# Patient Record
Sex: FEMALE | Race: WHITE | NOT HISPANIC OR LATINO | URBAN - NONMETROPOLITAN AREA
[De-identification: names, ages, dates, MRNs, and addresses within clinical notes are randomized per-mention and may not be internally consistent; named-entity substitution may affect disease eponyms.]

---

## 2024-08-15 ENCOUNTER — OFFICE VISIT (OUTPATIENT)
Dept: FAMILY MEDICINE CLINIC | Facility: CLINIC | Age: 24
End: 2024-08-15
Payer: COMMERCIAL

## 2024-08-15 ENCOUNTER — PATIENT ROUNDING (BHMG ONLY) (OUTPATIENT)
Dept: FAMILY MEDICINE CLINIC | Facility: CLINIC | Age: 24
End: 2024-08-15

## 2024-08-15 VITALS
TEMPERATURE: 98 F | BODY MASS INDEX: 48.48 KG/M2 | HEIGHT: 65 IN | OXYGEN SATURATION: 100 % | SYSTOLIC BLOOD PRESSURE: 136 MMHG | HEART RATE: 99 BPM | WEIGHT: 291 LBS | DIASTOLIC BLOOD PRESSURE: 94 MMHG | RESPIRATION RATE: 16 BRPM

## 2024-08-15 DIAGNOSIS — Z76.89 ENCOUNTER TO ESTABLISH CARE: ICD-10-CM

## 2024-08-15 DIAGNOSIS — F41.0 PANIC ATTACKS: ICD-10-CM

## 2024-08-15 DIAGNOSIS — F41.9 ANXIETY: ICD-10-CM

## 2024-08-15 DIAGNOSIS — Z00.00 ANNUAL PHYSICAL EXAM: Primary | ICD-10-CM

## 2024-08-15 RX ORDER — FLUOXETINE 10 MG/1
10 CAPSULE ORAL DAILY
Qty: 30 CAPSULE | Refills: 1 | Status: SHIPPED | OUTPATIENT
Start: 2024-08-15

## 2024-08-15 RX ORDER — BUSPIRONE HYDROCHLORIDE 5 MG/1
5 TABLET ORAL 2 TIMES DAILY
Qty: 60 TABLET | Refills: 1 | Status: SHIPPED | OUTPATIENT
Start: 2024-08-15

## 2024-08-16 LAB
25(OH)D3+25(OH)D2 SERPL-MCNC: 29.7 NG/ML (ref 30–100)
ALBUMIN SERPL-MCNC: 4.5 G/DL (ref 3.5–5.2)
ALBUMIN/GLOB SERPL: 1.9 G/DL
ALP SERPL-CCNC: 65 U/L (ref 39–117)
ALT SERPL-CCNC: 23 U/L (ref 1–33)
AST SERPL-CCNC: 18 U/L (ref 1–32)
BASOPHILS # BLD AUTO: 0.03 10*3/MM3 (ref 0–0.2)
BASOPHILS NFR BLD AUTO: 0.4 % (ref 0–1.5)
BILIRUB SERPL-MCNC: 0.3 MG/DL (ref 0–1.2)
BUN SERPL-MCNC: 11 MG/DL (ref 6–20)
BUN/CREAT SERPL: 13.9 (ref 7–25)
CALCIUM SERPL-MCNC: 10 MG/DL (ref 8.6–10.5)
CHLORIDE SERPL-SCNC: 106 MMOL/L (ref 98–107)
CHOLEST SERPL-MCNC: 136 MG/DL (ref 0–200)
CO2 SERPL-SCNC: 24.9 MMOL/L (ref 22–29)
CREAT SERPL-MCNC: 0.79 MG/DL (ref 0.57–1)
EGFRCR SERPLBLD CKD-EPI 2021: 107.3 ML/MIN/1.73
EOSINOPHIL # BLD AUTO: 0.03 10*3/MM3 (ref 0–0.4)
EOSINOPHIL NFR BLD AUTO: 0.4 % (ref 0.3–6.2)
ERYTHROCYTE [DISTWIDTH] IN BLOOD BY AUTOMATED COUNT: 11.9 % (ref 12.3–15.4)
GLOBULIN SER CALC-MCNC: 2.4 GM/DL
GLUCOSE SERPL-MCNC: 80 MG/DL (ref 65–99)
HCT VFR BLD AUTO: 42.5 % (ref 34–46.6)
HDLC SERPL-MCNC: 43 MG/DL (ref 40–60)
HGB BLD-MCNC: 13.8 G/DL (ref 12–15.9)
IMM GRANULOCYTES # BLD AUTO: 0.01 10*3/MM3 (ref 0–0.05)
IMM GRANULOCYTES NFR BLD AUTO: 0.1 % (ref 0–0.5)
LDLC SERPL CALC-MCNC: 78 MG/DL (ref 0–100)
LYMPHOCYTES # BLD AUTO: 1.42 10*3/MM3 (ref 0.7–3.1)
LYMPHOCYTES NFR BLD AUTO: 18.1 % (ref 19.6–45.3)
MCH RBC QN AUTO: 29 PG (ref 26.6–33)
MCHC RBC AUTO-ENTMCNC: 32.5 G/DL (ref 31.5–35.7)
MCV RBC AUTO: 89.3 FL (ref 79–97)
MONOCYTES # BLD AUTO: 0.63 10*3/MM3 (ref 0.1–0.9)
MONOCYTES NFR BLD AUTO: 8 % (ref 5–12)
NEUTROPHILS # BLD AUTO: 5.74 10*3/MM3 (ref 1.7–7)
NEUTROPHILS NFR BLD AUTO: 73 % (ref 42.7–76)
NRBC BLD AUTO-RTO: 0 /100 WBC (ref 0–0.2)
PLATELET # BLD AUTO: 323 10*3/MM3 (ref 140–450)
POTASSIUM SERPL-SCNC: 4 MMOL/L (ref 3.5–5.2)
PROT SERPL-MCNC: 6.9 G/DL (ref 6–8.5)
RBC # BLD AUTO: 4.76 10*6/MM3 (ref 3.77–5.28)
SODIUM SERPL-SCNC: 139 MMOL/L (ref 136–145)
TRIGL SERPL-MCNC: 73 MG/DL (ref 0–150)
TSH SERPL DL<=0.005 MIU/L-ACNC: 1.19 UIU/ML (ref 0.27–4.2)
VIT B12 SERPL-MCNC: 355 PG/ML (ref 211–946)
VLDLC SERPL CALC-MCNC: 15 MG/DL (ref 5–40)
WBC # BLD AUTO: 7.86 10*3/MM3 (ref 3.4–10.8)

## 2025-03-26 ENCOUNTER — TELEPHONE (OUTPATIENT)
Dept: FAMILY MEDICINE CLINIC | Facility: CLINIC | Age: 25
End: 2025-03-26

## 2025-03-26 NOTE — TELEPHONE ENCOUNTER
Caller: Julia Auguste    Relationship to patient: Self    Best call back number:     [phone] (Home), PLEASE LEAVE VOICEMAIL       Chief complaint: HEADACHE, DIZZINESS AND RIGHT EAR PAIN    Type of visit: OFFICE VISIT    Requested date: 3/26/25 ANY TIME

## 2025-04-08 ENCOUNTER — OFFICE VISIT (OUTPATIENT)
Dept: FAMILY MEDICINE CLINIC | Facility: CLINIC | Age: 25
End: 2025-04-08
Payer: COMMERCIAL

## 2025-04-08 VITALS
WEIGHT: 293 LBS | SYSTOLIC BLOOD PRESSURE: 141 MMHG | HEIGHT: 65 IN | OXYGEN SATURATION: 98 % | TEMPERATURE: 98.6 F | RESPIRATION RATE: 18 BRPM | HEART RATE: 90 BPM | DIASTOLIC BLOOD PRESSURE: 87 MMHG | BODY MASS INDEX: 48.82 KG/M2

## 2025-04-08 DIAGNOSIS — H61.23 IMPACTED CERUMEN OF BOTH EARS: ICD-10-CM

## 2025-04-08 DIAGNOSIS — H91.91 DECREASED HEARING OF RIGHT EAR: Primary | ICD-10-CM

## 2025-04-08 DIAGNOSIS — H92.01: ICD-10-CM

## 2025-04-08 NOTE — PROGRESS NOTES
"Chief Complaint  Earache    Subjective        Julia Auguste presents to [facility]  Earache   There is pain in the right ear. This is a new problem. The current episode started 1 to 4 weeks ago. The problem has been worse.     History of Present Illness  The patient is a 25-year-old female who presents for evaluation of an earache.    She reports experiencing a dull, stuffy sensation in her right ear for the past 2 weeks. The pain, which she rates as a 3 on a scale of 1 to 10, intensifies during the night. She has been managing the discomfort with over-the-counter Tylenol. She does not have any associated fever or chills. She also reports episodes of dizziness and tinnitus but does not have any cough. She has no history of chronic ear infections.    MEDICATIONS  Discontinued: Prozac, Buspar  pt says she does not take them any more.        The following portions of the patient's history were reviewed and updated as appropriate: allergies, current medications, past family history, past medical history, past social history, past surgical history and problem list.    Objective   Vital Signs:  /87 (BP Location: Right arm, Patient Position: Sitting, Cuff Size: Large Adult)   Pulse 90   Temp 98.6 °F (37 °C) (Infrared)   Resp 18   Ht 165.1 cm (65\") Comment: per patient  Wt (!) 137 kg (302 lb)   SpO2 98%   BMI 50.26 kg/m²   Estimated body mass index is 50.26 kg/m² as calculated from the following:    Height as of this encounter: 165.1 cm (65\").    Weight as of this encounter: 137 kg (302 lb).       Class 3 Severe Obesity (BMI >=40). Obesity-related health conditions include the following: none. Obesity is worsening. BMI is is above average; BMI management plan is completed. We discussed portion control and increasing exercise.    Physical Exam  Vitals and nursing note reviewed.   Constitutional:       General: She is awake.      Appearance: Normal appearance. She is " well-developed and well-groomed.   HENT:      Head: Normocephalic and atraumatic.      Right Ear: External ear normal. Decreased hearing noted. There is impacted cerumen.      Left Ear: Hearing, tympanic membrane, ear canal and external ear normal. There is impacted cerumen.      Ears:      Comments: Right and left ear is impacted with cerumen that is so hard, can not move it with curette, pt needs to go home and use mineral oil or debrox to work on softening wax.  She may return to have irrigation      Nose: Nose normal.      Mouth/Throat:      Lips: Pink.      Pharynx: Oropharynx is clear.   Eyes:      General: Lids are normal.      Conjunctiva/sclera: Conjunctivae normal.   Cardiovascular:      Rate and Rhythm: Normal rate and regular rhythm.      Heart sounds: Normal heart sounds.   Pulmonary:      Effort: Pulmonary effort is normal.      Breath sounds: Normal breath sounds and air entry.   Musculoskeletal:      Cervical back: Full passive range of motion without pain.      Right lower leg: No edema.      Left lower leg: No edema.   Lymphadenopathy:      Head:      Right side of head: No submental, submandibular or tonsillar adenopathy.      Left side of head: No submental, submandibular or tonsillar adenopathy.   Skin:     General: Skin is warm and dry.   Neurological:      Mental Status: She is alert.      Sensory: Sensation is intact.      Motor: Motor function is intact.      Coordination: Coordination is intact.      Gait: Gait is intact.   Psychiatric:         Attention and Perception: Attention and perception normal.         Mood and Affect: Mood and affect normal.         Speech: Speech normal.         Behavior: Behavior normal. Behavior is cooperative.         Thought Content: Thought content normal.         Cognition and Memory: Cognition and memory normal.         Judgment: Judgment normal.        Physical Exam      Result Review :          Results             Assessment and Plan     Diagnoses and all  orders for this visit:    1. Decreased hearing of right ear (Primary)  2. Earache symptoms, right  3. Impacted cerumen of both ears       Pt was instructed to get some mineral oil and put a couple drops in ear nightly and then she can return to have ears irrigated        May continue to take ibuprofen or tylenol as directed as needed for pain and fever.     Assessment & Plan        ICD-10-CM ICD-9-CM   1. Decreased hearing of right ear  H91.91 389.9   2. Earache symptoms, right  H92.01 388.70   3. Impacted cerumen of both ears  H61.23 380.4              pt needs to go home and use mineral oil or debrox to work on softening wax.  She may return to have irrigation   Follow Up     Return in about 1 week (around 4/15/2025), or follow next tuesday, for Recheck.  Patient was given instructions and counseling regarding her condition or for health maintenance advice. Please see specific information pulled into the AVS if appropriate.       Patient or patient representative verbalized consent for the use of Ambient Listening during the visit with  Nahed Lassiter DNP, RODRIGUEZ for chart documentation. 4/8/2025  08:34 CDT    Electronically signed by Nahed Lassiter DNP, RODRIGUEZ, 04/08/25, 8:34 AM CDT.

## 2025-04-15 ENCOUNTER — OFFICE VISIT (OUTPATIENT)
Dept: FAMILY MEDICINE CLINIC | Facility: CLINIC | Age: 25
End: 2025-04-15
Payer: COMMERCIAL

## 2025-04-15 VITALS
TEMPERATURE: 97.1 F | OXYGEN SATURATION: 96 % | HEIGHT: 65 IN | WEIGHT: 293 LBS | SYSTOLIC BLOOD PRESSURE: 130 MMHG | BODY MASS INDEX: 48.82 KG/M2 | HEART RATE: 91 BPM | DIASTOLIC BLOOD PRESSURE: 85 MMHG

## 2025-04-15 DIAGNOSIS — H91.93 DECREASED HEARING OF BOTH EARS: Primary | ICD-10-CM

## 2025-04-15 DIAGNOSIS — H61.23 BILATERAL IMPACTED CERUMEN: ICD-10-CM

## 2025-04-15 NOTE — PROGRESS NOTES
"Chief Complaint  Follow-up (Pt here for f/u on decreased hearing in right ear. Pt reports hearing has improved since last visit. )    Subjective        Julia Auguste presents to [facility]  History of Present Illness  History of Present Illness  The patient is a 25-year-old female who presents for ear flushing. She was previously seen on 04/08/2025 for decreased hearing and impacted cerumen. She was advised to manage the condition and return for a follow-up.    She reports an improvement in her auditory function, attributing it to her efforts in managing the impacted cerumen. She experiences mild hearing loss in her left ear, which she believes is due to her self-care interventions. She does not experience systemic symptoms such as fever, chills, nausea, vomiting, or diarrhea. However, she reports experiencing tinnitus and vertigo, predominantly during nighttime.    The following portions of the patient's history were reviewed and updated as appropriate: allergies, current medications, past family history, past medical history, past social history, past surgical history and problem list.    Objective   Vital Signs:  /85 (BP Location: Left arm, Patient Position: Sitting, Cuff Size: Large Adult)   Pulse 91   Temp 97.1 °F (36.2 °C) (Infrared)   Ht 165.1 cm (65\")   Wt (!) 139 kg (307 lb)   SpO2 96%   BMI 51.09 kg/m²   Estimated body mass index is 51.09 kg/m² as calculated from the following:    Height as of this encounter: 165.1 cm (65\").    Weight as of this encounter: 139 kg (307 lb).       Class 3 Severe Obesity (BMI >=40). Obesity-related health conditions include the following: none. Obesity is worsening. BMI is is above average; BMI management plan is completed. We discussed portion control and increasing exercise.      Physical Exam  Vitals and nursing note reviewed.   Constitutional:       General: She is awake.      Appearance: Normal appearance. She is " well-developed and well-groomed.   HENT:      Head: Normocephalic and atraumatic.      Right Ear: External ear normal. Decreased hearing noted. There is impacted cerumen.      Left Ear: External ear normal. Decreased hearing noted. There is impacted cerumen.      Ears:      Comments: Bilateral ear impaction       Nose: Nose normal.      Mouth/Throat:      Lips: Pink.      Pharynx: Oropharynx is clear.   Eyes:      General: Lids are normal.      Conjunctiva/sclera: Conjunctivae normal.   Cardiovascular:      Rate and Rhythm: Normal rate and regular rhythm.      Heart sounds: Normal heart sounds.   Pulmonary:      Effort: Pulmonary effort is normal.      Breath sounds: Normal breath sounds and air entry.   Musculoskeletal:      Cervical back: Full passive range of motion without pain.      Right lower leg: No edema.      Left lower leg: No edema.   Lymphadenopathy:      Head:      Right side of head: No submental, submandibular or tonsillar adenopathy.      Left side of head: No submental, submandibular or tonsillar adenopathy.   Skin:     General: Skin is warm and dry.   Neurological:      Mental Status: She is alert and oriented to person, place, and time.      Sensory: Sensation is intact.      Motor: Motor function is intact.      Coordination: Coordination is intact.      Gait: Gait is intact.   Psychiatric:         Attention and Perception: Attention and perception normal.         Mood and Affect: Mood and affect normal.         Speech: Speech normal.         Behavior: Behavior normal. Behavior is cooperative.         Thought Content: Thought content normal.         Cognition and Memory: Cognition and memory normal.         Judgment: Judgment normal.      Physical Exam  Vital Signs  Blood pressure is normal at 130/85. Heart rate is 91.    Result Review :          Results      Ear Cerumen Removal    Date/Time: 4/15/2025 8:10 AM    Performed by: Nahed Lassiter, RUBEN, APRN  Authorized by: Nahed Lassiter,  "RUBEN, APRN  Consent: Verbal consent obtained. Written consent obtained  Risks and benefits: risks, benefits and alternatives were discussed  Consent given by: patient  Patient understanding: patient states understanding of the procedure being performed  Patient consent: the patient's understanding of the procedure matches consent given  Procedure consent: procedure consent matches procedure scheduled  Relevant documents: relevant documents present and verified  Test results: test results not available  Site marked: the operative site was not marked  Imaging studies: imaging studies not available  Patient identity confirmed: verbally with patient  Time out: Immediately prior to procedure a \"time out\" was called to verify the correct patient, procedure, equipment, support staff and site/side marked as required.    Anesthesia:  Local Anesthetic: none  Location details: left ear and right ear  Patient tolerance: patient tolerated the procedure well with no immediate complications  Comments: Risk, benefits and alternative options for impacted cerumen discussed.  Pt wishes to proceed; verbal consent to irrigate ear obtained from the patient prior to beginning the procedure.   A curette was used in both ears unsuccessful.  mixture of 1 part luke warm water and 1 part hydrogen peroxide used to  flush/irrigated bilateral ear(s); bilateral ear(s) were irrigated with a 10 ml syringe and a 0.5 inch blunt  luer lock otic ; a moderate amount of cerumen removed from bilateral ear canal; post assessment revealed normal bilateral TM(s); negative for injections, perforations, erythema, retractions, scaring, or bulging; ; Well tolerated by patient.; no complains of pain during or post procedure, normal hearing post exam.   Procedure type: instrumentation, irrigation   Sedation:  Patient sedated: no            Assessment and Plan     Diagnoses and all orders for this visit:    1. Decreased hearing of both ears (Primary)    2. " Bilateral impacted cerumen  -     Ear Cerumen Removal      Assessment & Plan  1. Impacted cerumen.  She reports improvement in hearing but still experiences some hearing loss in the left ear, likely due to continued impacted cerumen. No fever, chills, nausea, vomiting, or diarrhea were reported. Mild tinnitus and dizziness are present, mainly at night.      ICD-10-CM ICD-9-CM   1. Decreased hearing of both ears  H91.93 389.9   2. Bilateral impacted cerumen  H61.23 380.4     Encouraged pt to use a couple drops of mineral oil in each ear weekly to prevent build up of cerumen.             Follow Up     No follow-ups on file.  Patient was given instructions and counseling regarding her condition or for health maintenance advice. Please see specific information pulled into the AVS if appropriate.       Patient or patient representative verbalized consent for the use of Ambient Listening during the visit with  Nahed Lassiter, RUBEN, APRN for chart documentation. 4/15/2025  12:54 CDT